# Patient Record
Sex: MALE | Race: WHITE | NOT HISPANIC OR LATINO | Employment: OTHER | URBAN - METROPOLITAN AREA
[De-identification: names, ages, dates, MRNs, and addresses within clinical notes are randomized per-mention and may not be internally consistent; named-entity substitution may affect disease eponyms.]

---

## 2023-03-16 LAB
ALANINE AMINOTRANSFERASE (SGPT) (U/L) IN SER/PLAS: 20 U/L (ref 10–52)
ALBUMIN (G/DL) IN SER/PLAS: 4.3 G/DL (ref 3.4–5)
ALKALINE PHOSPHATASE (U/L) IN SER/PLAS: 94 U/L (ref 33–136)
ANION GAP IN SER/PLAS: 12 MMOL/L (ref 10–20)
ASPARTATE AMINOTRANSFERASE (SGOT) (U/L) IN SER/PLAS: 18 U/L (ref 9–39)
BASOPHILS (10*3/UL) IN BLOOD BY AUTOMATED COUNT: 0.08 X10E9/L (ref 0–0.1)
BASOPHILS/100 LEUKOCYTES IN BLOOD BY AUTOMATED COUNT: 1.1 % (ref 0–2)
BILIRUBIN TOTAL (MG/DL) IN SER/PLAS: 0.5 MG/DL (ref 0–1.2)
CALCIUM (MG/DL) IN SER/PLAS: 9 MG/DL (ref 8.6–10.3)
CARBON DIOXIDE, TOTAL (MMOL/L) IN SER/PLAS: 34 MMOL/L (ref 21–32)
CHLORIDE (MMOL/L) IN SER/PLAS: 99 MMOL/L (ref 98–107)
CHOLESTEROL (MG/DL) IN SER/PLAS: 159 MG/DL (ref 0–199)
CHOLESTEROL IN HDL (MG/DL) IN SER/PLAS: 32.8 MG/DL
CHOLESTEROL/HDL RATIO: 4.8
CREATININE (MG/DL) IN SER/PLAS: 1.33 MG/DL (ref 0.5–1.3)
EOSINOPHILS (10*3/UL) IN BLOOD BY AUTOMATED COUNT: 0.4 X10E9/L (ref 0–0.4)
EOSINOPHILS/100 LEUKOCYTES IN BLOOD BY AUTOMATED COUNT: 5.3 % (ref 0–6)
ERYTHROCYTE DISTRIBUTION WIDTH (RATIO) BY AUTOMATED COUNT: 13.5 % (ref 11.5–14.5)
ERYTHROCYTE MEAN CORPUSCULAR HEMOGLOBIN CONCENTRATION (G/DL) BY AUTOMATED: 32.3 G/DL (ref 32–36)
ERYTHROCYTE MEAN CORPUSCULAR VOLUME (FL) BY AUTOMATED COUNT: 90 FL (ref 80–100)
ERYTHROCYTES (10*6/UL) IN BLOOD BY AUTOMATED COUNT: 4.59 X10E12/L (ref 4.5–5.9)
GFR MALE: 57 ML/MIN/1.73M2
GLUCOSE (MG/DL) IN SER/PLAS: 85 MG/DL (ref 74–99)
HEMATOCRIT (%) IN BLOOD BY AUTOMATED COUNT: 41.5 % (ref 41–52)
HEMOGLOBIN (G/DL) IN BLOOD: 13.4 G/DL (ref 13.5–17.5)
IMMATURE GRANULOCYTES/100 LEUKOCYTES IN BLOOD BY AUTOMATED COUNT: 0.4 % (ref 0–0.9)
LDL: 79 MG/DL (ref 0–99)
LEUKOCYTES (10*3/UL) IN BLOOD BY AUTOMATED COUNT: 7.6 X10E9/L (ref 4.4–11.3)
LYMPHOCYTES (10*3/UL) IN BLOOD BY AUTOMATED COUNT: 1.4 X10E9/L (ref 0.8–3)
LYMPHOCYTES/100 LEUKOCYTES IN BLOOD BY AUTOMATED COUNT: 18.5 % (ref 13–44)
MONOCYTES (10*3/UL) IN BLOOD BY AUTOMATED COUNT: 0.73 X10E9/L (ref 0.05–0.8)
MONOCYTES/100 LEUKOCYTES IN BLOOD BY AUTOMATED COUNT: 9.6 % (ref 2–10)
NEUTROPHILS (10*3/UL) IN BLOOD BY AUTOMATED COUNT: 4.93 X10E9/L (ref 1.6–5.5)
NEUTROPHILS/100 LEUKOCYTES IN BLOOD BY AUTOMATED COUNT: 65.1 % (ref 40–80)
NON HDL CHOLESTEROL: 126 MG/DL
PLATELETS (10*3/UL) IN BLOOD AUTOMATED COUNT: 226 X10E9/L (ref 150–450)
POTASSIUM (MMOL/L) IN SER/PLAS: 4.5 MMOL/L (ref 3.5–5.3)
PROTEIN TOTAL: 6.9 G/DL (ref 6.4–8.2)
SODIUM (MMOL/L) IN SER/PLAS: 140 MMOL/L (ref 136–145)
THYROTROPIN (MIU/L) IN SER/PLAS BY DETECTION LIMIT <= 0.05 MIU/L: 1.8 MIU/L (ref 0.44–3.98)
TRIGLYCERIDE (MG/DL) IN SER/PLAS: 235 MG/DL (ref 0–149)
UREA NITROGEN (MG/DL) IN SER/PLAS: 18 MG/DL (ref 6–23)
VLDL: 47 MG/DL (ref 0–40)

## 2023-03-17 LAB — PROSTATE SPECIFIC AG (NG/ML) IN SER/PLAS: 3.32 NG/ML (ref 0–4)

## 2023-08-30 ENCOUNTER — HOSPITAL ENCOUNTER (OUTPATIENT)
Dept: DATA CONVERSION | Facility: HOSPITAL | Age: 73
End: 2023-08-30

## 2023-08-30 DIAGNOSIS — I63.9 CEREBRAL INFARCTION, UNSPECIFIED (MULTI): ICD-10-CM

## 2023-08-30 DIAGNOSIS — G47.33 OBSTRUCTIVE SLEEP APNEA (ADULT) (PEDIATRIC): ICD-10-CM

## 2023-09-02 ENCOUNTER — APPOINTMENT (OUTPATIENT)
Dept: CARDIOLOGY | Facility: CLINIC | Age: 73
End: 2023-09-02
Payer: MEDICARE

## 2023-09-13 PROBLEM — Q21.10 ATRIAL SEPTAL DEFECT WITHIN OVAL FOSSA (HHS-HCC): Status: ACTIVE | Noted: 2023-09-13

## 2023-09-13 PROBLEM — I10 ESSENTIAL HYPERTENSION: Status: ACTIVE | Noted: 2023-09-13

## 2023-09-13 PROBLEM — K21.9 GASTROESOPHAGEAL REFLUX DISEASE: Status: ACTIVE | Noted: 2023-09-13

## 2023-09-13 PROBLEM — R47.01 APHASIA: Status: ACTIVE | Noted: 2023-09-13

## 2023-09-13 PROBLEM — T50.995A DYE ALLERGIC REACTION: Status: ACTIVE | Noted: 2023-09-13

## 2023-09-13 PROBLEM — I69.398 VERTIGO AS LATE EFFECT OF CEREBROVASCULAR ACCIDENT (CVA): Status: ACTIVE | Noted: 2023-09-13

## 2023-09-13 PROBLEM — Z91.89 AT RISK FOR BLEEDING: Status: ACTIVE | Noted: 2023-09-13

## 2023-09-13 PROBLEM — N40.0 BENIGN PROSTATIC HYPERPLASIA: Status: ACTIVE | Noted: 2023-09-13

## 2023-09-13 PROBLEM — G62.9 NEUROPATHY: Status: ACTIVE | Noted: 2023-09-13

## 2023-09-13 PROBLEM — K59.00 CONSTIPATION: Status: ACTIVE | Noted: 2023-09-13

## 2023-09-13 PROBLEM — M62.10: Status: ACTIVE | Noted: 2023-09-13

## 2023-09-13 PROBLEM — F32.9 MAJOR DEPRESSIVE DISORDER, SINGLE EPISODE, UNSPECIFIED: Status: ACTIVE | Noted: 2023-09-13

## 2023-09-13 PROBLEM — R07.9 CHEST PAIN: Status: ACTIVE | Noted: 2023-09-13

## 2023-09-13 PROBLEM — I63.422 CEREBROVASCULAR ACCIDENT (CVA) DUE TO EMBOLISM OF LEFT ANTERIOR CEREBRAL ARTERY (MULTI): Status: ACTIVE | Noted: 2023-09-13

## 2023-09-13 PROBLEM — G81.91 HEMIPARESIS, RIGHT (MULTI): Status: ACTIVE | Noted: 2023-09-13

## 2023-09-13 PROBLEM — R56.9 SEIZURE (MULTI): Status: ACTIVE | Noted: 2023-09-13

## 2023-09-13 PROBLEM — G47.33 OBSTRUCTIVE SLEEP APNEA SYNDROME: Status: ACTIVE | Noted: 2023-09-13

## 2023-09-13 PROBLEM — F32.A DEPRESSION: Status: ACTIVE | Noted: 2023-09-13

## 2023-09-13 PROBLEM — M19.91 PRIMARY OSTEOARTHRITIS: Status: ACTIVE | Noted: 2023-09-13

## 2023-09-13 PROBLEM — I63.9 STROKE (MULTI): Status: ACTIVE | Noted: 2023-09-13

## 2023-09-13 PROBLEM — R10.9 ABDOMINAL PAIN: Status: ACTIVE | Noted: 2023-09-13

## 2023-09-13 PROBLEM — R42 VERTIGO AS LATE EFFECT OF CEREBROVASCULAR ACCIDENT (CVA): Status: ACTIVE | Noted: 2023-09-13

## 2023-09-13 PROBLEM — Q21.12 PATENT FORAMEN OVALE (HHS-HCC): Status: ACTIVE | Noted: 2023-09-13

## 2023-09-13 RX ORDER — CARVEDILOL 6.25 MG/1
1 TABLET ORAL
COMMUNITY
End: 2024-03-04 | Stop reason: WASHOUT

## 2023-09-13 RX ORDER — GABAPENTIN 300 MG/1
1 CAPSULE ORAL DAILY
COMMUNITY
Start: 2021-04-05 | End: 2024-03-04 | Stop reason: SDUPTHER

## 2023-09-13 RX ORDER — WARFARIN 2 MG/1
2 TABLET ORAL
COMMUNITY
End: 2024-03-04 | Stop reason: WASHOUT

## 2023-09-13 RX ORDER — PANTOPRAZOLE SODIUM 40 MG/1
1 TABLET, DELAYED RELEASE ORAL 2 TIMES DAILY
COMMUNITY
End: 2024-03-04 | Stop reason: WASHOUT

## 2023-09-13 RX ORDER — ASPIRIN 81 MG/1
1 TABLET ORAL DAILY
COMMUNITY
Start: 2021-05-19

## 2023-09-13 RX ORDER — METOCLOPRAMIDE 5 MG/1
1 TABLET ORAL
COMMUNITY
End: 2024-03-04 | Stop reason: WASHOUT

## 2023-09-13 RX ORDER — SIMVASTATIN 40 MG/1
1 TABLET, FILM COATED ORAL NIGHTLY
COMMUNITY
Start: 2018-08-01 | End: 2024-03-04 | Stop reason: WASHOUT

## 2023-09-13 RX ORDER — OMEPRAZOLE 20 MG/1
1 CAPSULE, DELAYED RELEASE ORAL 2 TIMES DAILY
COMMUNITY
Start: 2021-04-05 | End: 2024-03-04 | Stop reason: WASHOUT

## 2023-09-13 RX ORDER — BACLOFEN 5 MG/1
1 TABLET ORAL 2 TIMES DAILY PRN
COMMUNITY
End: 2023-12-22

## 2023-09-13 RX ORDER — ATORVASTATIN CALCIUM 80 MG/1
1 TABLET, FILM COATED ORAL DAILY
COMMUNITY
End: 2024-03-04 | Stop reason: WASHOUT

## 2023-09-13 RX ORDER — TAMSULOSIN HYDROCHLORIDE 0.4 MG/1
1 CAPSULE ORAL DAILY
COMMUNITY
End: 2024-03-04 | Stop reason: WASHOUT

## 2023-09-13 RX ORDER — ESCITALOPRAM OXALATE 10 MG/1
1 TABLET ORAL DAILY
COMMUNITY
End: 2024-03-04 | Stop reason: WASHOUT

## 2023-09-13 RX ORDER — SERTRALINE HYDROCHLORIDE 100 MG/1
1 TABLET, FILM COATED ORAL DAILY
COMMUNITY
End: 2024-03-04 | Stop reason: WASHOUT

## 2023-09-13 RX ORDER — ATORVASTATIN CALCIUM 20 MG/1
1 TABLET, FILM COATED ORAL NIGHTLY
COMMUNITY
Start: 2021-07-22 | End: 2024-03-04 | Stop reason: WASHOUT

## 2023-09-13 RX ORDER — DICYCLOMINE HYDROCHLORIDE 10 MG/1
1 CAPSULE ORAL EVERY 4 HOURS PRN
COMMUNITY
End: 2024-03-04 | Stop reason: WASHOUT

## 2023-09-13 RX ORDER — OMEPRAZOLE 40 MG/1
1 CAPSULE, DELAYED RELEASE ORAL
COMMUNITY
End: 2024-03-04 | Stop reason: SDUPTHER

## 2023-09-13 RX ORDER — LEVETIRACETAM 500 MG/1
1 TABLET ORAL 2 TIMES DAILY
COMMUNITY
End: 2024-03-04 | Stop reason: WASHOUT

## 2023-09-13 RX ORDER — ATORVASTATIN CALCIUM 40 MG/1
1 TABLET, FILM COATED ORAL DAILY
COMMUNITY
End: 2024-03-04 | Stop reason: SDUPTHER

## 2023-09-13 RX ORDER — LANOLIN ALCOHOL/MO/W.PET/CERES
1 CREAM (GRAM) TOPICAL DAILY
COMMUNITY

## 2023-09-13 RX ORDER — SERTRALINE HYDROCHLORIDE 50 MG/1
1 TABLET, FILM COATED ORAL DAILY
COMMUNITY
Start: 2021-11-04 | End: 2024-03-04 | Stop reason: SDUPTHER

## 2023-09-13 RX ORDER — ESOMEPRAZOLE MAGNESIUM 40 MG/1
1 CAPSULE, DELAYED RELEASE ORAL DAILY
COMMUNITY
End: 2024-03-04 | Stop reason: WASHOUT

## 2023-09-13 RX ORDER — POLYETHYLENE GLYCOL 3350 17 G/17G
17 POWDER, FOR SOLUTION ORAL DAILY
COMMUNITY
End: 2024-03-04 | Stop reason: WASHOUT

## 2023-09-13 RX ORDER — ACETAMINOPHEN 325 MG/1
2 TABLET ORAL EVERY 6 HOURS PRN
COMMUNITY

## 2023-09-13 RX ORDER — LISINOPRIL AND HYDROCHLOROTHIAZIDE 12.5; 2 MG/1; MG/1
1 TABLET ORAL DAILY
COMMUNITY
Start: 2021-06-08 | End: 2024-03-04 | Stop reason: SDUPTHER

## 2023-09-14 LAB
ALANINE AMINOTRANSFERASE (SGPT) (U/L) IN SER/PLAS: 20 U/L (ref 10–52)
ALBUMIN (G/DL) IN SER/PLAS: 4.3 G/DL (ref 3.4–5)
ALKALINE PHOSPHATASE (U/L) IN SER/PLAS: 82 U/L (ref 33–136)
ANION GAP IN SER/PLAS: 14 MMOL/L (ref 10–20)
ASPARTATE AMINOTRANSFERASE (SGOT) (U/L) IN SER/PLAS: 18 U/L (ref 9–39)
BASOPHILS (10*3/UL) IN BLOOD BY AUTOMATED COUNT: 0.08 X10E9/L (ref 0–0.1)
BASOPHILS/100 LEUKOCYTES IN BLOOD BY AUTOMATED COUNT: 1.3 % (ref 0–2)
BILIRUBIN TOTAL (MG/DL) IN SER/PLAS: 0.7 MG/DL (ref 0–1.2)
CALCIUM (MG/DL) IN SER/PLAS: 9.1 MG/DL (ref 8.6–10.3)
CARBON DIOXIDE, TOTAL (MMOL/L) IN SER/PLAS: 32 MMOL/L (ref 21–32)
CHLORIDE (MMOL/L) IN SER/PLAS: 99 MMOL/L (ref 98–107)
CHOLESTEROL (MG/DL) IN SER/PLAS: 157 MG/DL (ref 0–199)
CHOLESTEROL IN HDL (MG/DL) IN SER/PLAS: 37.3 MG/DL
CHOLESTEROL/HDL RATIO: 4.2
CREATININE (MG/DL) IN SER/PLAS: 1.34 MG/DL (ref 0.5–1.3)
EOSINOPHILS (10*3/UL) IN BLOOD BY AUTOMATED COUNT: 0.31 X10E9/L (ref 0–0.4)
EOSINOPHILS/100 LEUKOCYTES IN BLOOD BY AUTOMATED COUNT: 4.9 % (ref 0–6)
ERYTHROCYTE DISTRIBUTION WIDTH (RATIO) BY AUTOMATED COUNT: 14.6 % (ref 11.5–14.5)
ERYTHROCYTE MEAN CORPUSCULAR HEMOGLOBIN CONCENTRATION (G/DL) BY AUTOMATED: 31.6 G/DL (ref 32–36)
ERYTHROCYTE MEAN CORPUSCULAR VOLUME (FL) BY AUTOMATED COUNT: 91 FL (ref 80–100)
ERYTHROCYTES (10*6/UL) IN BLOOD BY AUTOMATED COUNT: 4.77 X10E12/L (ref 4.5–5.9)
GFR MALE: 56 ML/MIN/1.73M2
GLUCOSE (MG/DL) IN SER/PLAS: 81 MG/DL (ref 74–99)
HEMATOCRIT (%) IN BLOOD BY AUTOMATED COUNT: 43.3 % (ref 41–52)
HEMOGLOBIN (G/DL) IN BLOOD: 13.7 G/DL (ref 13.5–17.5)
IMMATURE GRANULOCYTES/100 LEUKOCYTES IN BLOOD BY AUTOMATED COUNT: 0.3 % (ref 0–0.9)
LDL: 88 MG/DL (ref 0–99)
LEUKOCYTES (10*3/UL) IN BLOOD BY AUTOMATED COUNT: 6.4 X10E9/L (ref 4.4–11.3)
LYMPHOCYTES (10*3/UL) IN BLOOD BY AUTOMATED COUNT: 1.37 X10E9/L (ref 0.8–3)
LYMPHOCYTES/100 LEUKOCYTES IN BLOOD BY AUTOMATED COUNT: 21.5 % (ref 13–44)
MONOCYTES (10*3/UL) IN BLOOD BY AUTOMATED COUNT: 0.46 X10E9/L (ref 0.05–0.8)
MONOCYTES/100 LEUKOCYTES IN BLOOD BY AUTOMATED COUNT: 7.2 % (ref 2–10)
NEUTROPHILS (10*3/UL) IN BLOOD BY AUTOMATED COUNT: 4.13 X10E9/L (ref 1.6–5.5)
NEUTROPHILS/100 LEUKOCYTES IN BLOOD BY AUTOMATED COUNT: 64.8 % (ref 40–80)
PLATELETS (10*3/UL) IN BLOOD AUTOMATED COUNT: 197 X10E9/L (ref 150–450)
POTASSIUM (MMOL/L) IN SER/PLAS: 4 MMOL/L (ref 3.5–5.3)
PROTEIN TOTAL: 6.9 G/DL (ref 6.4–8.2)
SODIUM (MMOL/L) IN SER/PLAS: 141 MMOL/L (ref 136–145)
THYROTROPIN (MIU/L) IN SER/PLAS BY DETECTION LIMIT <= 0.05 MIU/L: 2.15 MIU/L (ref 0.44–3.98)
TRIGLYCERIDE (MG/DL) IN SER/PLAS: 157 MG/DL (ref 0–149)
UREA NITROGEN (MG/DL) IN SER/PLAS: 21 MG/DL (ref 6–23)
VLDL: 31 MG/DL (ref 0–40)

## 2023-09-15 LAB — PROSTATE SPECIFIC AG (NG/ML) IN SER/PLAS: 3.69 NG/ML (ref 0–4)

## 2023-09-20 ENCOUNTER — HOSPITAL ENCOUNTER (OUTPATIENT)
Dept: DATA CONVERSION | Facility: HOSPITAL | Age: 73
Discharge: HOME | End: 2023-09-20
Payer: MEDICARE

## 2023-09-20 DIAGNOSIS — I63.9 CEREBRAL INFARCTION, UNSPECIFIED (MULTI): ICD-10-CM

## 2023-09-20 DIAGNOSIS — R47.01 APHASIA: ICD-10-CM

## 2023-10-05 ENCOUNTER — HOSPITAL ENCOUNTER (OUTPATIENT)
Dept: CARDIOLOGY | Facility: HOSPITAL | Age: 73
Discharge: HOME | End: 2023-10-05
Payer: MEDICARE

## 2023-10-05 DIAGNOSIS — I63.9 CEREBROVASCULAR ACCIDENT (CVA), UNSPECIFIED MECHANISM (MULTI): ICD-10-CM

## 2023-10-05 PROCEDURE — 93306 TTE W/DOPPLER COMPLETE: CPT

## 2023-10-05 PROCEDURE — 93306 TTE W/DOPPLER COMPLETE: CPT | Performed by: INTERNAL MEDICINE

## 2023-10-06 LAB — EJECTION FRACTION APICAL 4 CHAMBER: 57.4

## 2023-11-20 ENCOUNTER — TELEPHONE (OUTPATIENT)
Dept: PRIMARY CARE | Facility: CLINIC | Age: 73
End: 2023-11-20
Payer: MEDICARE

## 2023-11-20 NOTE — TELEPHONE ENCOUNTER
Pt spouse states pt wore a heart monitor in September and has already sent it back to the company.

## 2023-12-22 ENCOUNTER — TELEPHONE (OUTPATIENT)
Dept: PRIMARY CARE | Facility: CLINIC | Age: 73
End: 2023-12-22
Payer: MEDICARE

## 2023-12-22 DIAGNOSIS — R25.2 SPASTICITY: Primary | ICD-10-CM

## 2023-12-22 RX ORDER — BACLOFEN 10 MG/1
10 TABLET ORAL 2 TIMES DAILY
Qty: 60 TABLET | Refills: 5 | Status: SHIPPED | OUTPATIENT
Start: 2023-12-22 | End: 2024-03-04 | Stop reason: SDUPTHER

## 2023-12-22 NOTE — TELEPHONE ENCOUNTER
Spouse states neurology used to Rx baclofen but will not Rx until next appt in January.  Asking if you could Rx because not sure will make [place] appt if weather is bad.      States pt currently on baclofen 10 mg.  eCW shows 10 mg but Epic showing 5 mg.  Please advise if will Rx.      [facility] [place] [address] [facility]    Has 18 days left (36 tabs)

## 2024-02-29 ASSESSMENT — ENCOUNTER SYMPTOMS
ABDOMINAL PAIN: 0
SHORTNESS OF BREATH: 0
FEVER: 0

## 2024-02-29 NOTE — PROGRESS NOTES
[facility]: [facility]  PROGRESS NOTE      Victorino Kennedy is a 73 y.o. male that is presenting today for No chief complaint on file..    Assessment/Plan   Diagnoses and all orders for this visit:  Essential hypertension  Mixed hyperlipidemia  Benign prostatic hyperplasia with urinary frequency  Hemiparesis, right (CMS/HCC)  Depression, unspecified depression type  Gastroesophageal reflux disease without esophagitis  Obstructive sleep apnea syndrome  Seizure (CMS/HCC)    Free of anginal symptoms and is continuing to take the atorvastatin and aspirin for secondary prevention of stroke.  The baclofen has been helpful for spasticity.  Blood pressure controlled on lisinopril and hydrochlorothiazide.  He has intermittent neuropathic symptoms, gabapentin is helpful.  Omeprazole is helpful for the GERD.  Continue all medications as is.  Will see him in 6 months with laboratory studies beforehand.  Reviewed the screening and prevention schedule.    Mood continues to be good and he and his wife agree that he should continue the sertraline.    Subjective   HPI  73 y.o. male here for follow up.  Doing great overall and has no complaints.  Functional status is stable.  He has had no recent falls.  No chest pain, shortness of breath or any anginal symptoms.    Review of Systems   Constitutional:  Negative for fever.   Respiratory:  Negative for shortness of breath.    Cardiovascular:  Negative for chest pain.   Gastrointestinal:  Negative for abdominal pain.   All other systems reviewed and are negative.     Objective   There were no vitals filed for this visit.   There is no height or weight on file to calculate BMI.  Physical Exam  Vitals reviewed.   Constitutional:       Appearance: Normal appearance.   Cardiovascular:      Rate and Rhythm: Normal rate and regular rhythm.      Heart sounds: No murmur heard.  Pulmonary:      Breath sounds: Normal breath sounds. No wheezing, rhonchi or rales.  "  Musculoskeletal:      Right lower leg: No edema.      Left lower leg: No edema.       Diagnostic Results   Lab Results   Component Value Date    GLUCOSE 81 09/14/2023    CALCIUM 9.1 09/14/2023     09/14/2023    K 4.0 09/14/2023    CO2 32 09/14/2023    CL 99 09/14/2023    BUN 21 09/14/2023    CREATININE 1.34 (H) 09/14/2023     Lab Results   Component Value Date    ALT 20 09/14/2023    AST 18 09/14/2023    ALKPHOS 82 09/14/2023    BILITOT 0.7 09/14/2023     Lab Results   Component Value Date    WBC 6.4 09/14/2023    HGB 13.7 09/14/2023    HCT 43.3 09/14/2023    MCV 91 09/14/2023     09/14/2023     Lab Results   Component Value Date    CHOL 157 09/14/2023    CHOL 159 03/16/2023    CHOL 156 08/02/2022     Lab Results   Component Value Date    HDL 37.3 (A) 09/14/2023    HDL 32.8 (A) 03/16/2023    HDL 32.2 (A) 08/02/2022     Lab Results   Component Value Date    LDLCALC 124 05/19/2022    LDLCALC 90 11/03/2021    LDLCALC 147 (H) 06/21/2021     Lab Results   Component Value Date    TRIG 157 (H) 09/14/2023    TRIG 235 (H) 03/16/2023    TRIG 193 (H) 08/02/2022     No components found for: \"CHOLHDL\"  Lab Results   Component Value Date    HGBA1C 6.1 (H) 09/05/2020     Other labs not included in the list above were reviewed either before or during this encounter.    History    No past medical history on file.  Past Surgical History:   Procedure Laterality Date    MR HEAD ANGIO WO IV CONTRAST  9/5/2020    MR HEAD ANGIO WO IV CONTRAST [facility]    MR HEAD ANGIO WO IV CONTRAST  9/20/2023    MR HEAD ANGIO WO IV CONTRAST [facility]    OTHER SURGICAL HISTORY  04/05/2021    Cholecystectomy     No family history on file.  Social History     Socioeconomic History    Marital status:      Spouse name: Not on file    Number of children: Not on file    Years of education: Not on file    Highest education level: Not on file   Occupational History    Not on file   Tobacco Use    Smoking status: Not on " file    Smokeless tobacco: Not on file   Substance and Sexual Activity    Alcohol use: Not on file    Drug use: Not on file    Sexual activity: Not on file   Other Topics Concern    Not on file   Social History Narrative    Not on file     Social Determinants of Health     Financial Resource Strain: Not on file   Food Insecurity: Not on file   Transportation Needs: Not on file   Physical Activity: Not on file   Stress: Not on file   Social Connections: Not on file   Intimate Partner Violence: Not on file   Housing Stability: Not on file     Allergies   Allergen Reactions    Shellfish Derived Anaphylaxis     Current Outpatient Medications on File Prior to Visit   Medication Sig Dispense Refill    acetaminophen (Tylenol) 325 mg tablet Take 2 tablets (650 mg) by mouth every 6 hours if needed for mild pain (1 - 3).      aspirin 81 mg EC tablet Take 1 tablet (81 mg) by mouth once daily.      atorvastatin (Lipitor) 20 mg tablet Take 1 tablet (20 mg) by mouth once daily at bedtime.      atorvastatin (Lipitor) 40 mg tablet Take 1 tablet (40 mg) by mouth once daily.      atorvastatin (Lipitor) 80 mg tablet Take 1 tablet (80 mg) by mouth once daily.      baclofen (Lioresal) 10 mg tablet Take 1 tablet (10 mg) by mouth 2 times a day. 60 tablet 5    carvedilol (Coreg) 6.25 mg tablet Take 1 tablet (6.25 mg) by mouth 2 times a day with meals.      cyanocobalamin (Vitamin B-12) 1,000 mcg tablet Take 1 tablet (1,000 mcg) by mouth once daily.      dicyclomine (Bentyl) 10 mg capsule Take 1 capsule (10 mg) by mouth every 4 hours if needed (spasms or abdominal pain).      escitalopram (Lexapro) 10 mg tablet Take 1 tablet (10 mg) by mouth once daily.      esomeprazole (NexIUM) 40 mg DR capsule Take 1 capsule (40 mg) by mouth once daily.      gabapentin (Neurontin) 300 mg capsule Take 1 capsule (300 mg) by mouth once daily.      levETIRAcetam (Keppra) 500 mg tablet Take 1 tablet (500 mg) by mouth 2 times a day.       lisinopriL-hydrochlorothiazide 20-12.5 mg tablet Take 1 tablet by mouth once daily.      metoclopramide (Reglan) 5 mg tablet Take 1 tablet (5 mg) by mouth 4 times a day before meals.      MULTIVITAMIN ORAL Multivitamin      NON FORMULARY Take by mouth. MISC NATURAL PRODUCTS -      omeprazole (PriLOSEC) 20 mg DR capsule Take 1 capsule (20 mg) by mouth 2 times a day.      omeprazole (PriLOSEC) 40 mg DR capsule Take 1 capsule (40 mg) by mouth once daily in the morning. Take before meals.      pantoprazole (ProtoNix) 40 mg EC tablet Take 1 tablet (40 mg) by mouth 2 times a day. DO NOT CRUSH.      polyethylene glycol (Glycolax, Miralax) 17 gram/dose powder Take 17 g by mouth once daily.      sertraline (Zoloft) 100 mg tablet Take 1 tablet (100 mg) by mouth once daily.      sertraline (Zoloft) 50 mg tablet Take 1 tablet (50 mg) by mouth once daily.      simvastatin (Zocor) 40 mg tablet Take 1 tablet (40 mg) by mouth once daily at bedtime.      tamsulosin (Flomax) 0.4 mg 24 hr capsule Take 1 capsule (0.4 mg) by mouth once daily.      warfarin (Coumadin) 2 mg tablet Take 2 tablets (4 mg) by mouth. Daily       No current facility-administered medications on file prior to visit.     Immunization History   Administered Date(s) Administered    Flu vaccine, quadrivalent, high-dose, preservative free, age 65y+ (FLUZONE) 11/08/2022    Influenza, High Dose Seasonal, Preservative Free 09/16/2020, 10/20/2021    Pfizer COVID-19 vaccine, bivalent, age 12 years and older (30 mcg/0.3 mL) 11/08/2022    Pfizer Gray Cap SARS-CoV-2 06/28/2022    Pfizer Purple Cap SARS-CoV-2 03/16/2021, 04/15/2021, 10/20/2021    Pneumococcal conjugate vaccine, 13-valent (PREVNAR 13) 08/07/2017    Pneumococcal polysaccharide vaccine, 23-valent, age 2 years and older (PNEUMOVAX 23) 09/16/2020    Tdap vaccine, age 7 year and older (BOOSTRIX, ADACEL) 12/30/2014     Patient's medical history was reviewed and updated either before or during this encounter.        Gonzalo Aguirre MD

## 2024-03-04 ENCOUNTER — OFFICE VISIT (OUTPATIENT)
Dept: PRIMARY CARE | Facility: CLINIC | Age: 74
End: 2024-03-04
Payer: MEDICARE

## 2024-03-04 VITALS
HEART RATE: 87 BPM | SYSTOLIC BLOOD PRESSURE: 120 MMHG | WEIGHT: 194 LBS | TEMPERATURE: 98 F | BODY MASS INDEX: 28.73 KG/M2 | OXYGEN SATURATION: 97 % | HEIGHT: 69 IN | DIASTOLIC BLOOD PRESSURE: 78 MMHG

## 2024-03-04 DIAGNOSIS — G81.91 HEMIPARESIS, RIGHT (MULTI): ICD-10-CM

## 2024-03-04 DIAGNOSIS — Z01.89 ENCOUNTER FOR ROUTINE LABORATORY TESTING: ICD-10-CM

## 2024-03-04 DIAGNOSIS — E78.2 MIXED HYPERLIPIDEMIA: ICD-10-CM

## 2024-03-04 DIAGNOSIS — R73.9 HYPERGLYCEMIA: ICD-10-CM

## 2024-03-04 DIAGNOSIS — N18.31 STAGE 3A CHRONIC KIDNEY DISEASE (MULTI): ICD-10-CM

## 2024-03-04 DIAGNOSIS — G47.33 OBSTRUCTIVE SLEEP APNEA SYNDROME: ICD-10-CM

## 2024-03-04 DIAGNOSIS — R56.9 SEIZURE (MULTI): ICD-10-CM

## 2024-03-04 DIAGNOSIS — F32.A DEPRESSION, UNSPECIFIED DEPRESSION TYPE: ICD-10-CM

## 2024-03-04 DIAGNOSIS — I10 ESSENTIAL HYPERTENSION: Primary | ICD-10-CM

## 2024-03-04 DIAGNOSIS — F33.9 RECURRENT MAJOR DEPRESSIVE DISORDER, REMISSION STATUS UNSPECIFIED (CMS-HCC): ICD-10-CM

## 2024-03-04 DIAGNOSIS — N40.1 BENIGN PROSTATIC HYPERPLASIA WITH URINARY FREQUENCY: ICD-10-CM

## 2024-03-04 DIAGNOSIS — R25.2 SPASTICITY: ICD-10-CM

## 2024-03-04 DIAGNOSIS — K21.9 GASTROESOPHAGEAL REFLUX DISEASE WITHOUT ESOPHAGITIS: ICD-10-CM

## 2024-03-04 DIAGNOSIS — R35.0 BENIGN PROSTATIC HYPERPLASIA WITH URINARY FREQUENCY: ICD-10-CM

## 2024-03-04 DIAGNOSIS — E55.9 VITAMIN D DEFICIENCY: ICD-10-CM

## 2024-03-04 PROBLEM — G81.90 HEMIPLEGIA (MULTI): Status: ACTIVE | Noted: 2024-03-04

## 2024-03-04 PROCEDURE — 99214 OFFICE O/P EST MOD 30 MIN: CPT | Performed by: INTERNAL MEDICINE

## 2024-03-04 PROCEDURE — 3078F DIAST BP <80 MM HG: CPT | Performed by: INTERNAL MEDICINE

## 2024-03-04 PROCEDURE — 1159F MED LIST DOCD IN RCRD: CPT | Performed by: INTERNAL MEDICINE

## 2024-03-04 PROCEDURE — 3074F SYST BP LT 130 MM HG: CPT | Performed by: INTERNAL MEDICINE

## 2024-03-04 PROCEDURE — 1157F ADVNC CARE PLAN IN RCRD: CPT | Performed by: INTERNAL MEDICINE

## 2024-03-04 PROCEDURE — 1036F TOBACCO NON-USER: CPT | Performed by: INTERNAL MEDICINE

## 2024-03-04 PROCEDURE — 1126F AMNT PAIN NOTED NONE PRSNT: CPT | Performed by: INTERNAL MEDICINE

## 2024-03-04 RX ORDER — GABAPENTIN 300 MG/1
300 CAPSULE ORAL DAILY
Qty: 90 CAPSULE | Refills: 3 | Status: SHIPPED | OUTPATIENT
Start: 2024-03-04 | End: 2025-03-04

## 2024-03-04 RX ORDER — ATORVASTATIN CALCIUM 40 MG/1
40 TABLET, FILM COATED ORAL DAILY
Qty: 90 TABLET | Refills: 3 | Status: SHIPPED | OUTPATIENT
Start: 2024-03-04 | End: 2025-03-04

## 2024-03-04 RX ORDER — SERTRALINE HYDROCHLORIDE 50 MG/1
50 TABLET, FILM COATED ORAL DAILY
Qty: 90 TABLET | Refills: 3 | Status: SHIPPED | OUTPATIENT
Start: 2024-03-04 | End: 2025-03-04

## 2024-03-04 RX ORDER — OMEPRAZOLE 40 MG/1
40 CAPSULE, DELAYED RELEASE ORAL
Qty: 90 CAPSULE | Refills: 3 | Status: SHIPPED | OUTPATIENT
Start: 2024-03-04 | End: 2025-03-04

## 2024-03-04 RX ORDER — LISINOPRIL AND HYDROCHLOROTHIAZIDE 12.5; 2 MG/1; MG/1
1 TABLET ORAL DAILY
Qty: 90 TABLET | Refills: 3 | Status: SHIPPED | OUTPATIENT
Start: 2024-03-04 | End: 2025-03-04

## 2024-03-04 RX ORDER — BACLOFEN 10 MG/1
10 TABLET ORAL 2 TIMES DAILY
Qty: 180 TABLET | Refills: 3 | Status: SHIPPED | OUTPATIENT
Start: 2024-03-04 | End: 2025-03-04

## 2024-03-04 ASSESSMENT — PATIENT HEALTH QUESTIONNAIRE - PHQ9
SUM OF ALL RESPONSES TO PHQ9 QUESTIONS 1 AND 2: 0
1. LITTLE INTEREST OR PLEASURE IN DOING THINGS: NOT AT ALL
2. FEELING DOWN, DEPRESSED OR HOPELESS: NOT AT ALL

## 2024-03-04 ASSESSMENT — ENCOUNTER SYMPTOMS
DEPRESSION: 0
OCCASIONAL FEELINGS OF UNSTEADINESS: 1
LOSS OF SENSATION IN FEET: 0

## 2024-03-04 ASSESSMENT — PAIN SCALES - GENERAL: PAINLEVEL: 0-NO PAIN

## 2024-07-10 ENCOUNTER — NURSE TRIAGE (OUTPATIENT)
Dept: PRIMARY CARE | Facility: CLINIC | Age: 74
End: 2024-07-10
Payer: MEDICARE

## 2024-08-27 ENCOUNTER — LAB (OUTPATIENT)
Dept: LAB | Facility: LAB | Age: 74
End: 2024-08-27
Payer: MEDICARE

## 2024-08-27 DIAGNOSIS — R73.9 HYPERGLYCEMIA: ICD-10-CM

## 2024-08-27 DIAGNOSIS — Z01.89 ENCOUNTER FOR ROUTINE LABORATORY TESTING: ICD-10-CM

## 2024-08-27 DIAGNOSIS — I10 ESSENTIAL HYPERTENSION: ICD-10-CM

## 2024-08-27 DIAGNOSIS — E55.9 VITAMIN D DEFICIENCY: ICD-10-CM

## 2024-08-27 LAB
ALBUMIN SERPL BCP-MCNC: 4.2 G/DL (ref 3.4–5)
ALP SERPL-CCNC: 90 U/L (ref 33–136)
ALT SERPL W P-5'-P-CCNC: 23 U/L (ref 10–52)
ANION GAP SERPL CALC-SCNC: 16 MMOL/L (ref 10–20)
AST SERPL W P-5'-P-CCNC: 18 U/L (ref 9–39)
BASOPHILS # BLD AUTO: 0.07 X10*3/UL (ref 0–0.1)
BASOPHILS NFR BLD AUTO: 0.9 %
BILIRUB SERPL-MCNC: 0.5 MG/DL (ref 0–1.2)
BUN SERPL-MCNC: 21 MG/DL (ref 6–23)
CALCIUM SERPL-MCNC: 8.7 MG/DL (ref 8.6–10.3)
CHLORIDE SERPL-SCNC: 101 MMOL/L (ref 98–107)
CHOLEST SERPL-MCNC: 147 MG/DL (ref 0–199)
CHOLESTEROL/HDL RATIO: 4.1
CO2 SERPL-SCNC: 27 MMOL/L (ref 21–32)
CREAT SERPL-MCNC: 1.4 MG/DL (ref 0.5–1.3)
EGFRCR SERPLBLD CKD-EPI 2021: 53 ML/MIN/1.73M*2
EOSINOPHIL # BLD AUTO: 0.32 X10*3/UL (ref 0–0.4)
EOSINOPHIL NFR BLD AUTO: 3.9 %
ERYTHROCYTE [DISTWIDTH] IN BLOOD BY AUTOMATED COUNT: 14.5 % (ref 11.5–14.5)
GLUCOSE SERPL-MCNC: 99 MG/DL (ref 74–99)
HCT VFR BLD AUTO: 42 % (ref 41–52)
HDLC SERPL-MCNC: 35.5 MG/DL
HGB BLD-MCNC: 13.5 G/DL (ref 13.5–17.5)
IMM GRANULOCYTES # BLD AUTO: 0.04 X10*3/UL (ref 0–0.5)
IMM GRANULOCYTES NFR BLD AUTO: 0.5 % (ref 0–0.9)
LDLC SERPL CALC-MCNC: 69 MG/DL
LYMPHOCYTES # BLD AUTO: 1.32 X10*3/UL (ref 0.8–3)
LYMPHOCYTES NFR BLD AUTO: 16 %
MCH RBC QN AUTO: 29.1 PG (ref 26–34)
MCHC RBC AUTO-ENTMCNC: 32.1 G/DL (ref 32–36)
MCV RBC AUTO: 91 FL (ref 80–100)
MONOCYTES # BLD AUTO: 0.65 X10*3/UL (ref 0.05–0.8)
MONOCYTES NFR BLD AUTO: 7.9 %
NEUTROPHILS # BLD AUTO: 5.83 X10*3/UL (ref 1.6–5.5)
NEUTROPHILS NFR BLD AUTO: 70.8 %
NON HDL CHOLESTEROL: 112 MG/DL (ref 0–149)
NRBC BLD-RTO: 0 /100 WBCS (ref 0–0)
PLATELET # BLD AUTO: 203 X10*3/UL (ref 150–450)
POTASSIUM SERPL-SCNC: 4.4 MMOL/L (ref 3.5–5.3)
PROT SERPL-MCNC: 6.9 G/DL (ref 6.4–8.2)
RBC # BLD AUTO: 4.64 X10*6/UL (ref 4.5–5.9)
SODIUM SERPL-SCNC: 140 MMOL/L (ref 136–145)
TRIGL SERPL-MCNC: 214 MG/DL (ref 0–149)
TSH SERPL-ACNC: 2.37 MIU/L (ref 0.44–3.98)
VLDL: 43 MG/DL (ref 0–40)
WBC # BLD AUTO: 8.2 X10*3/UL (ref 4.4–11.3)

## 2024-08-27 PROCEDURE — 36415 COLL VENOUS BLD VENIPUNCTURE: CPT

## 2024-08-27 PROCEDURE — 83036 HEMOGLOBIN GLYCOSYLATED A1C: CPT

## 2024-08-27 PROCEDURE — 84443 ASSAY THYROID STIM HORMONE: CPT

## 2024-08-27 PROCEDURE — 80053 COMPREHEN METABOLIC PANEL: CPT

## 2024-08-27 PROCEDURE — 80061 LIPID PANEL: CPT

## 2024-08-27 PROCEDURE — 85025 COMPLETE CBC W/AUTO DIFF WBC: CPT

## 2024-08-27 PROCEDURE — 82306 VITAMIN D 25 HYDROXY: CPT

## 2024-08-28 LAB
25(OH)D3 SERPL-MCNC: 25 NG/ML (ref 30–100)
EST. AVERAGE GLUCOSE BLD GHB EST-MCNC: 128 MG/DL
HBA1C MFR BLD: 6.1 %

## 2024-09-05 ASSESSMENT — ENCOUNTER SYMPTOMS
SHORTNESS OF BREATH: 0
CHILLS: 0
FEVER: 0
ABDOMINAL PAIN: 0
VOMITING: 0
HEADACHES: 0
COUGH: 0
APPETITE CHANGE: 0
DIARRHEA: 0
NAUSEA: 0

## 2024-09-05 NOTE — PROGRESS NOTES
[facility]: [facility]  MEDICARE WELLNESS EXAM      Victorino Kennedy is a 73 y.o. male that is presenting today for Annual Exam.    Assessment/Plan    Diagnoses and all orders for this visit:  Annual physical exam  Essential hypertension  Mixed hyperlipidemia  Hemiparesis, right (Multi)  Benign prostatic hyperplasia with urinary frequency  Depression, unspecified depression type  Gastroesophageal reflux disease without esophagitis  Seizure (Multi)  Vitamin D deficiency  Stage 3a chronic kidney disease (Multi)  Obstructive sleep apnea syndrome  Spasticity  Encounter for immunization  -     Flu vaccine, trivalent, preservative free, HIGH-DOSE, age 65y+ (Fluzone)    Continue aspirin and statin for secondary prevention of stroke.  The baclofen and the gabapentin a been helpful for his spasticity and neuropathic symptoms.  Blood pressure controlled on lisinopril, hydrochlorothiazide.  The sertraline continues to be effective for mood.  Omeprazole is helpful for GERD and he does get reflux symptoms without it, continue as is    ADVANCED CARE PLANNING  Advanced Care Planning was discussed with patient:  The patient has an active advanced care plan on file. The patient has an active surrogate decision-maker on file.  Encouraged the patient to confirm that Living Will and Healthcare Power of  (HCPoA) are accurate and up to date.  Encouraged the patient to confirm that our office be provided a copy of any documentation in the event that anything changes.    ACTIVITIES OF DAILY LIVING  Basic ADLs:  Bathing: Independent, Dressing: Independent, Toileting: Independent, Transferring: Independent, Continence: Independent, Feeding: Independent.    Instrumental ADLs:  Ability to use phone: Independent, Shopping: Independent, Cooking: Independent, House-keeping: Independent, Laundry: Independent, Transportation: Independent, Medication Management: Independent, Finance Management:  Independent.    Subjective   HPI  This patient presents today for annual physical, Medicare wellness exam.  Discussed screening/prevention, healthy lifestyle and code status.   Reviewed the patient's wishes regarding decision making.    Consultant visits and notes reviewed: Sleep med ([facility])    Stable from a functional standpoint in regard to ADLs and IADLs.  No recent falls are reported.    The patient denies chest pain and shortness of breath.  No exertion-provoked or anginal-type symptoms are reported.    Review of Systems   Constitutional:  Negative for appetite change, chills and fever.   Respiratory:  Negative for cough and shortness of breath.    Cardiovascular:  Negative for chest pain.   Gastrointestinal:  Negative for abdominal pain, diarrhea, nausea and vomiting.   Neurological:  Negative for headaches.   All other systems reviewed and are negative.    Objective   Vitals:    09/06/24 1540   BP: 120/66   Pulse: 84   Temp: 35.9 °C (96.6 °F)   SpO2: 97%      Body mass index is 29.09 kg/m².  Physical Exam  Vitals reviewed.   Constitutional:       General: He is not in acute distress.     Appearance: He is not toxic-appearing.   HENT:      Head: Normocephalic and atraumatic.      Mouth/Throat:      Mouth: Mucous membranes are moist.   Eyes:      Pupils: Pupils are equal, round, and reactive to light.   Cardiovascular:      Rate and Rhythm: Normal rate and regular rhythm.      Heart sounds: No murmur heard.  Pulmonary:      Breath sounds: Normal breath sounds. No wheezing, rhonchi or rales.   Abdominal:      General: There is no distension.      Palpations: Abdomen is soft.   Musculoskeletal:      Right lower leg: No edema.      Left lower leg: No edema.   Neurological:      Mental Status: He is alert.     Stable right hemiparesis    Diagnostic Results   Lab Results   Component Value Date    GLUCOSE 99 08/27/2024    CALCIUM 8.7 08/27/2024     08/27/2024    K 4.4 08/27/2024    CO2 27 08/27/2024    CL  "101 08/27/2024    BUN 21 08/27/2024    CREATININE 1.40 (H) 08/27/2024     Lab Results   Component Value Date    ALT 23 08/27/2024    AST 18 08/27/2024    ALKPHOS 90 08/27/2024    BILITOT 0.5 08/27/2024     Lab Results   Component Value Date    WBC 8.2 08/27/2024    HGB 13.5 08/27/2024    HCT 42.0 08/27/2024    MCV 91 08/27/2024     08/27/2024     Lab Results   Component Value Date    CHOL 147 08/27/2024    CHOL 157 09/14/2023    CHOL 159 03/16/2023     Lab Results   Component Value Date    HDL 35.5 08/27/2024    HDL 37.3 (A) 09/14/2023    HDL 32.8 (A) 03/16/2023     Lab Results   Component Value Date    LDLCALC 69 08/27/2024    LDLCALC 124 05/19/2022    LDLCALC 90 11/03/2021     Lab Results   Component Value Date    TRIG 214 (H) 08/27/2024    TRIG 157 (H) 09/14/2023    TRIG 235 (H) 03/16/2023     No components found for: \"CHOLHDL\"  Lab Results   Component Value Date    HGBA1C 6.1 (H) 08/27/2024     Other labs not included in the list above reviewed either before or during this encounter.    History   Past Medical History:   Diagnosis Date    Stroke (Multi)      Past Surgical History:   Procedure Laterality Date    MR HEAD ANGIO WO IV CONTRAST  9/5/2020    MR HEAD ANGIO WO IV CONTRAST [facility]    MR HEAD ANGIO WO IV CONTRAST  9/20/2023    MR HEAD ANGIO WO IV CONTRAST [facility]    OTHER SURGICAL HISTORY  04/05/2021    Cholecystectomy     No family history on file.  Social History     Socioeconomic History    Marital status:      Spouse name: Not on file    Number of children: Not on file    Years of education: Not on file    Highest education level: Not on file   Occupational History    Not on file   Tobacco Use    Smoking status: Never    Smokeless tobacco: Never   Vaping Use    Vaping status: Never Used   Substance and Sexual Activity    Alcohol use: Not Currently    Drug use: Never    Sexual activity: Not on file   Other Topics Concern    Not on file   Social History Narrative "    Not on file     Social Determinants of Health     Financial Resource Strain: Not on file   Food Insecurity: Not on file   Transportation Needs: Not on file   Physical Activity: Not on file   Stress: Not on file   Social Connections: Not on file   Intimate Partner Violence: Not on file   Housing Stability: Not on file     Allergies   Allergen Reactions    Shellfish Derived Anaphylaxis     Current Outpatient Medications on File Prior to Visit   Medication Sig Dispense Refill    acetaminophen (Tylenol) 325 mg tablet Take 2 tablets (650 mg) by mouth every 6 hours if needed for mild pain (1 - 3).      aspirin 81 mg EC tablet Take 1 tablet (81 mg) by mouth once daily.      atorvastatin (Lipitor) 40 mg tablet Take 1 tablet (40 mg) by mouth once daily. 90 tablet 3    baclofen (Lioresal) 10 mg tablet Take 1 tablet (10 mg) by mouth 2 times a day. 180 tablet 3    cyanocobalamin (Vitamin B-12) 1,000 mcg tablet Take 1 tablet (1,000 mcg) by mouth once daily.      gabapentin (Neurontin) 300 mg capsule Take 1 capsule (300 mg) by mouth once daily. 90 capsule 3    lisinopriL-hydrochlorothiazide 20-12.5 mg tablet Take 1 tablet by mouth once daily. 90 tablet 3    NON FORMULARY Lions rodrigo mushroom- 4500mg BID      omeprazole (PriLOSEC) 40 mg DR capsule Take 1 capsule (40 mg) by mouth once daily in the morning. Take before meals. 90 capsule 3    sertraline (Zoloft) 100 mg tablet take 1 tablet Orally Once a day 90 days 90 tablet 3    [DISCONTINUED] MULTIVITAMIN ORAL Multivitamin      [DISCONTINUED] sertraline (Zoloft) 50 mg tablet Take 1 tablet (50 mg) by mouth once daily. 90 tablet 3     No current facility-administered medications on file prior to visit.     Immunization History   Administered Date(s) Administered    Flu vaccine, quadrivalent, high-dose, preservative free, age 65y+ (FLUZONE) 11/08/2022, 11/21/2023    Flu vaccine, trivalent, preservative free, HIGH-DOSE, age 65y+ (Fluzone) 09/16/2020, 10/20/2021, 09/06/2024    Pfizer  COVID-19 vaccine, Fall 2023, 12 years and older, (30mcg/0.3mL) 11/21/2023    Pfizer COVID-19 vaccine, bivalent, age 12 years and older (30 mcg/0.3 mL) 11/08/2022    Pfizer Gray Cap SARS-CoV-2 06/28/2022    Pfizer Purple Cap SARS-CoV-2 03/16/2021, 04/15/2021, 10/20/2021    Pneumococcal conjugate vaccine, 13-valent (PREVNAR 13) 08/07/2017    Pneumococcal polysaccharide vaccine, 23-valent, age 2 years and older (PNEUMOVAX 23) 09/16/2020    Tdap vaccine, age 7 year and older (BOOSTRIX, ADACEL) 12/30/2014     Patient's medical history was reviewed and updated either before or during this encounter.     Gonzalo Aguirre MD

## 2024-09-06 ENCOUNTER — APPOINTMENT (OUTPATIENT)
Dept: PRIMARY CARE | Facility: CLINIC | Age: 74
End: 2024-09-06
Payer: MEDICARE

## 2024-09-06 ENCOUNTER — OFFICE VISIT (OUTPATIENT)
Dept: PRIMARY CARE | Facility: CLINIC | Age: 74
End: 2024-09-06
Payer: MEDICARE

## 2024-09-06 VITALS
SYSTOLIC BLOOD PRESSURE: 120 MMHG | DIASTOLIC BLOOD PRESSURE: 66 MMHG | WEIGHT: 197 LBS | HEART RATE: 84 BPM | TEMPERATURE: 96.6 F | BODY MASS INDEX: 29.18 KG/M2 | OXYGEN SATURATION: 97 % | HEIGHT: 69 IN

## 2024-09-06 DIAGNOSIS — K21.9 GASTROESOPHAGEAL REFLUX DISEASE WITHOUT ESOPHAGITIS: ICD-10-CM

## 2024-09-06 DIAGNOSIS — N18.31 STAGE 3A CHRONIC KIDNEY DISEASE (MULTI): ICD-10-CM

## 2024-09-06 DIAGNOSIS — N40.1 BENIGN PROSTATIC HYPERPLASIA WITH URINARY FREQUENCY: ICD-10-CM

## 2024-09-06 DIAGNOSIS — R35.0 BENIGN PROSTATIC HYPERPLASIA WITH URINARY FREQUENCY: ICD-10-CM

## 2024-09-06 DIAGNOSIS — I10 ESSENTIAL HYPERTENSION: ICD-10-CM

## 2024-09-06 DIAGNOSIS — F32.A DEPRESSION, UNSPECIFIED DEPRESSION TYPE: ICD-10-CM

## 2024-09-06 DIAGNOSIS — E55.9 VITAMIN D DEFICIENCY: ICD-10-CM

## 2024-09-06 DIAGNOSIS — Z23 ENCOUNTER FOR IMMUNIZATION: ICD-10-CM

## 2024-09-06 DIAGNOSIS — G81.91 HEMIPARESIS, RIGHT (MULTI): ICD-10-CM

## 2024-09-06 DIAGNOSIS — Z00.00 ANNUAL PHYSICAL EXAM: Primary | ICD-10-CM

## 2024-09-06 DIAGNOSIS — R56.9 SEIZURE (MULTI): ICD-10-CM

## 2024-09-06 DIAGNOSIS — R25.2 SPASTICITY: ICD-10-CM

## 2024-09-06 DIAGNOSIS — G47.33 OBSTRUCTIVE SLEEP APNEA SYNDROME: ICD-10-CM

## 2024-09-06 DIAGNOSIS — E78.2 MIXED HYPERLIPIDEMIA: ICD-10-CM

## 2024-09-06 PROCEDURE — 90662 IIV NO PRSV INCREASED AG IM: CPT | Performed by: INTERNAL MEDICINE

## 2024-09-06 PROCEDURE — 99215 OFFICE O/P EST HI 40 MIN: CPT | Performed by: INTERNAL MEDICINE

## 2024-09-06 ASSESSMENT — PATIENT HEALTH QUESTIONNAIRE - PHQ9
2. FEELING DOWN, DEPRESSED OR HOPELESS: NOT AT ALL
SUM OF ALL RESPONSES TO PHQ9 QUESTIONS 1 AND 2: 0
1. LITTLE INTEREST OR PLEASURE IN DOING THINGS: NOT AT ALL

## 2024-09-06 ASSESSMENT — ENCOUNTER SYMPTOMS
DEPRESSION: 0
OCCASIONAL FEELINGS OF UNSTEADINESS: 1
LOSS OF SENSATION IN FEET: 0

## 2024-09-06 ASSESSMENT — PAIN SCALES - GENERAL: PAINLEVEL: 0-NO PAIN

## 2025-03-02 ASSESSMENT — ENCOUNTER SYMPTOMS
FEVER: 0
ABDOMINAL PAIN: 0
SHORTNESS OF BREATH: 0

## 2025-03-02 NOTE — PROGRESS NOTES
[facility]: [facility]  PROGRESS NOTE      Victorino Kennedy is a 74 y.o. male that is presenting today for No chief complaint on file..    Assessment/Plan   Diagnoses and all orders for this visit:  Essential hypertension  Mixed hyperlipidemia  Hemiparesis, right (Multi)  Benign prostatic hyperplasia with urinary frequency  Depression, unspecified depression type  Seizure (Multi)  Stage 3a chronic kidney disease (Multi)  Gastroesophageal reflux disease without esophagitis  Obstructive sleep apnea syndrome    Subjective   HPI  74 y.o. male here for follow up.    Review of Systems   Constitutional:  Negative for fever.   Respiratory:  Negative for shortness of breath.    Cardiovascular:  Negative for chest pain.   Gastrointestinal:  Negative for abdominal pain.   All other systems reviewed and are negative.     Objective   There were no vitals filed for this visit.   There is no height or weight on file to calculate BMI.  Physical Exam  Vitals reviewed.   Constitutional:       Appearance: Normal appearance.   Cardiovascular:      Rate and Rhythm: Normal rate and regular rhythm.      Heart sounds: No murmur heard.  Pulmonary:      Breath sounds: Normal breath sounds. No wheezing, rhonchi or rales.   Musculoskeletal:      Right lower leg: No edema.      Left lower leg: No edema.       Diagnostic Results   Lab Results   Component Value Date    GLUCOSE 99 08/27/2024    CALCIUM 8.7 08/27/2024     08/27/2024    K 4.4 08/27/2024    CO2 27 08/27/2024     08/27/2024    BUN 21 08/27/2024    CREATININE 1.40 (H) 08/27/2024     Lab Results   Component Value Date    ALT 23 08/27/2024    AST 18 08/27/2024    ALKPHOS 90 08/27/2024    BILITOT 0.5 08/27/2024     Lab Results   Component Value Date    WBC 8.2 08/27/2024    HGB 13.5 08/27/2024    HCT 42.0 08/27/2024    MCV 91 08/27/2024     08/27/2024     Lab Results   Component Value Date    CHOL 147 08/27/2024    CHOL 157 09/14/2023    CHOL 159  "03/16/2023     Lab Results   Component Value Date    HDL 35.5 08/27/2024    HDL 37.3 (A) 09/14/2023    HDL 32.8 (A) 03/16/2023     Lab Results   Component Value Date    LDLCALC 69 08/27/2024    LDLCALC 124 05/19/2022    LDLCALC 90 11/03/2021     Lab Results   Component Value Date    TRIG 214 (H) 08/27/2024    TRIG 157 (H) 09/14/2023    TRIG 235 (H) 03/16/2023     No components found for: \"CHOLHDL\"  Lab Results   Component Value Date    HGBA1C 6.1 (H) 08/27/2024     Other labs not included in the list above were reviewed either before or during this encounter.    History    Past Medical History:   Diagnosis Date    Stroke (Multi)      Past Surgical History:   Procedure Laterality Date    MR HEAD ANGIO WO IV CONTRAST  9/5/2020    MR HEAD ANGIO WO IV CONTRAST [facility]    MR HEAD ANGIO WO IV CONTRAST  9/20/2023    MR HEAD ANGIO WO IV CONTRAST [facility]    OTHER SURGICAL HISTORY  04/05/2021    Cholecystectomy     No family history on file.  Social History     Socioeconomic History    Marital status:      Spouse name: Not on file    Number of children: Not on file    Years of education: Not on file    Highest education level: Not on file   Occupational History    Not on file   Tobacco Use    Smoking status: Never    Smokeless tobacco: Never   Vaping Use    Vaping status: Never Used   Substance and Sexual Activity    Alcohol use: Not Currently    Drug use: Never    Sexual activity: Not on file   Other Topics Concern    Not on file   Social History Narrative    Not on file     Social Drivers of Health     Financial Resource Strain: Not on file   Food Insecurity: Not on file   Transportation Needs: Not on file   Physical Activity: Not on file   Stress: Not on file   Social Connections: Not on file   Intimate Partner Violence: Not on file   Housing Stability: Not on file     Allergies   Allergen Reactions    Shellfish Derived Anaphylaxis     Current Outpatient Medications on File Prior to Visit "   Medication Sig Dispense Refill    acetaminophen (Tylenol) 325 mg tablet Take 2 tablets (650 mg) by mouth every 6 hours if needed for mild pain (1 - 3).      aspirin 81 mg EC tablet Take 1 tablet (81 mg) by mouth once daily.      atorvastatin (Lipitor) 40 mg tablet Take 1 tablet (40 mg) by mouth once daily. 90 tablet 3    baclofen (Lioresal) 10 mg tablet Take 1 tablet (10 mg) by mouth 2 times a day. 180 tablet 3    cyanocobalamin (Vitamin B-12) 1,000 mcg tablet Take 1 tablet (1,000 mcg) by mouth once daily.      gabapentin (Neurontin) 300 mg capsule Take 1 capsule (300 mg) by mouth once daily. 90 capsule 3    lisinopriL-hydrochlorothiazide 20-12.5 mg tablet Take 1 tablet by mouth once daily. 90 tablet 3    NON FORMULARY Lions rodrigo mushroom- 4500mg BID      omeprazole (PriLOSEC) 40 mg DR capsule Take 1 capsule (40 mg) by mouth once daily in the morning. Take before meals. 90 capsule 3    sertraline (Zoloft) 100 mg tablet take 1 tablet Orally Once a day 90 days 90 tablet 3     No current facility-administered medications on file prior to visit.     Immunization History   Administered Date(s) Administered    Flu vaccine, quadrivalent, high-dose, preservative free, age 65y+ (FLUZONE) 11/08/2022, 11/21/2023    Flu vaccine, trivalent, preservative free, HIGH-DOSE, age 65y+ (Fluzone) 09/16/2020, 10/20/2021, 09/06/2024    Pfizer COVID-19 vaccine, 12 years and older, (30mcg/0.3mL) (Comirnaty) 11/21/2023    Pfizer COVID-19 vaccine, bivalent, age 12 years and older (30 mcg/0.3 mL) 11/08/2022    Pfizer Gray Cap SARS-CoV-2 06/28/2022    Pfizer Purple Cap SARS-CoV-2 03/16/2021, 04/15/2021, 10/20/2021    Pneumococcal conjugate vaccine, 13-valent (PREVNAR 13) 08/07/2017    Pneumococcal polysaccharide vaccine, 23-valent, age 2 years and older (PNEUMOVAX 23) 09/16/2020    Tdap vaccine, age 7 year and older (BOOSTRIX, ADACEL) 12/30/2014     Patient's medical history was reviewed and updated either before or during this encounter.        Gonzalo Aguirre MD

## 2025-03-06 ENCOUNTER — OFFICE VISIT (OUTPATIENT)
Dept: PRIMARY CARE | Facility: CLINIC | Age: 75
End: 2025-03-06
Payer: MEDICARE

## 2025-03-06 DIAGNOSIS — Z12.5 ENCOUNTER FOR PROSTATE CANCER SCREENING: ICD-10-CM

## 2025-03-06 DIAGNOSIS — R56.9 SEIZURE (MULTI): ICD-10-CM

## 2025-03-06 DIAGNOSIS — R35.0 BENIGN PROSTATIC HYPERPLASIA WITH URINARY FREQUENCY: ICD-10-CM

## 2025-03-06 DIAGNOSIS — I10 ESSENTIAL HYPERTENSION: ICD-10-CM

## 2025-03-06 DIAGNOSIS — F32.A DEPRESSION, UNSPECIFIED DEPRESSION TYPE: ICD-10-CM

## 2025-03-06 DIAGNOSIS — K21.9 GASTROESOPHAGEAL REFLUX DISEASE WITHOUT ESOPHAGITIS: ICD-10-CM

## 2025-03-06 DIAGNOSIS — I10 ESSENTIAL HYPERTENSION: Primary | ICD-10-CM

## 2025-03-06 DIAGNOSIS — G81.91 HEMIPARESIS, RIGHT (MULTI): ICD-10-CM

## 2025-03-06 DIAGNOSIS — E78.2 MIXED HYPERLIPIDEMIA: ICD-10-CM

## 2025-03-06 DIAGNOSIS — N18.31 STAGE 3A CHRONIC KIDNEY DISEASE (MULTI): ICD-10-CM

## 2025-03-06 DIAGNOSIS — G47.33 OBSTRUCTIVE SLEEP APNEA SYNDROME: ICD-10-CM

## 2025-03-06 DIAGNOSIS — R25.2 SPASTICITY: ICD-10-CM

## 2025-03-06 DIAGNOSIS — R73.9 HYPERGLYCEMIA: ICD-10-CM

## 2025-03-06 DIAGNOSIS — E55.9 VITAMIN D DEFICIENCY: ICD-10-CM

## 2025-03-06 DIAGNOSIS — N40.1 BENIGN PROSTATIC HYPERPLASIA WITH URINARY FREQUENCY: ICD-10-CM

## 2025-03-06 RX ORDER — GABAPENTIN 300 MG/1
300 CAPSULE ORAL DAILY
Qty: 90 CAPSULE | Refills: 3 | Status: SHIPPED | OUTPATIENT
Start: 2025-03-06 | End: 2026-03-06

## 2025-03-06 RX ORDER — BACLOFEN 10 MG/1
10 TABLET ORAL 2 TIMES DAILY
Qty: 180 TABLET | Refills: 3 | Status: SHIPPED | OUTPATIENT
Start: 2025-03-06 | End: 2026-03-06

## 2025-03-06 RX ORDER — SERTRALINE HYDROCHLORIDE 100 MG/1
100 TABLET, FILM COATED ORAL DAILY
Qty: 90 TABLET | Refills: 3 | Status: SHIPPED | OUTPATIENT
Start: 2025-03-06

## 2025-03-06 RX ORDER — ATORVASTATIN CALCIUM 40 MG/1
40 TABLET, FILM COATED ORAL DAILY
Qty: 90 TABLET | Refills: 3 | Status: SHIPPED | OUTPATIENT
Start: 2025-03-06

## 2025-03-06 RX ORDER — OMEPRAZOLE 40 MG/1
40 CAPSULE, DELAYED RELEASE ORAL
Qty: 90 CAPSULE | Refills: 3 | Status: SHIPPED | OUTPATIENT
Start: 2025-03-06 | End: 2025-03-06 | Stop reason: SDUPTHER

## 2025-03-06 RX ORDER — LISINOPRIL AND HYDROCHLOROTHIAZIDE 12.5; 2 MG/1; MG/1
1 TABLET ORAL DAILY
Qty: 90 TABLET | Refills: 3 | Status: SHIPPED | OUTPATIENT
Start: 2025-03-06 | End: 2025-03-06 | Stop reason: SDUPTHER

## 2025-03-06 RX ORDER — LISINOPRIL AND HYDROCHLOROTHIAZIDE 12.5; 2 MG/1; MG/1
1 TABLET ORAL DAILY
Qty: 90 TABLET | Refills: 3 | Status: SHIPPED | OUTPATIENT
Start: 2025-03-06 | End: 2026-03-06

## 2025-03-06 RX ORDER — OMEPRAZOLE 40 MG/1
40 CAPSULE, DELAYED RELEASE ORAL
Qty: 90 CAPSULE | Refills: 3 | Status: SHIPPED | OUTPATIENT
Start: 2025-03-06

## 2025-03-06 NOTE — TELEPHONE ENCOUNTER
[facility] 9/6/24, [place] 9/8/25    Lisinopril - htz 20-12.5mg  Gabapentin 300mg  Omeprazole 40mg  Sertraline 100mg  Baclofen 10mg    [facility] [place] [address]

## 2025-08-27 ENCOUNTER — TELEPHONE (OUTPATIENT)
Dept: PRIMARY CARE | Facility: CLINIC | Age: 75
End: 2025-08-27
Payer: MEDICARE

## 2025-08-27 DIAGNOSIS — E78.2 MIXED HYPERLIPIDEMIA: Primary | ICD-10-CM

## 2025-08-27 DIAGNOSIS — R73.9 HYPERGLYCEMIA: ICD-10-CM

## 2025-08-27 DIAGNOSIS — E55.9 VITAMIN D DEFICIENCY: ICD-10-CM

## 2025-08-27 DIAGNOSIS — Z12.5 ENCOUNTER FOR PROSTATE CANCER SCREENING: ICD-10-CM

## 2025-09-03 LAB
25(OH)D3+25(OH)D2 SERPL-MCNC: 38 NG/ML (ref 30–100)
ALBUMIN SERPL-MCNC: 4.5 G/DL (ref 3.6–5.1)
ALP SERPL-CCNC: 86 U/L (ref 35–144)
ALT SERPL-CCNC: 20 U/L (ref 9–46)
ANION GAP SERPL CALCULATED.4IONS-SCNC: 9 MMOL/L (CALC) (ref 7–17)
AST SERPL-CCNC: 16 U/L (ref 10–35)
BASOPHILS # BLD AUTO: 49 CELLS/UL (ref 0–200)
BASOPHILS NFR BLD AUTO: 0.6 %
BILIRUB SERPL-MCNC: 0.7 MG/DL (ref 0.2–1.2)
BUN SERPL-MCNC: 25 MG/DL (ref 7–25)
CALCIUM SERPL-MCNC: 9.3 MG/DL (ref 8.6–10.3)
CHLORIDE SERPL-SCNC: 102 MMOL/L (ref 98–110)
CHOLEST SERPL-MCNC: 169 MG/DL
CHOLEST/HDLC SERPL: 4.7 (CALC)
CO2 SERPL-SCNC: 32 MMOL/L (ref 20–32)
CREAT SERPL-MCNC: 1.68 MG/DL (ref 0.7–1.28)
EGFRCR SERPLBLD CKD-EPI 2021: 43 ML/MIN/1.73M2
EOSINOPHIL # BLD AUTO: 328 CELLS/UL (ref 15–500)
EOSINOPHIL NFR BLD AUTO: 4 %
ERYTHROCYTE [DISTWIDTH] IN BLOOD BY AUTOMATED COUNT: 14.1 % (ref 11–15)
EST. AVERAGE GLUCOSE BLD GHB EST-MCNC: 134 MG/DL
EST. AVERAGE GLUCOSE BLD GHB EST-SCNC: 7.4 MMOL/L
GLUCOSE SERPL-MCNC: 106 MG/DL (ref 65–99)
HBA1C MFR BLD: 6.3 %
HCT VFR BLD AUTO: 41.4 % (ref 38.5–50)
HDLC SERPL-MCNC: 36 MG/DL
HGB BLD-MCNC: 13.7 G/DL (ref 13.2–17.1)
LDLC SERPL CALC-MCNC: 96 MG/DL (CALC)
LYMPHOCYTES # BLD AUTO: 1615 CELLS/UL (ref 850–3900)
LYMPHOCYTES NFR BLD AUTO: 19.7 %
MCH RBC QN AUTO: 30.9 PG (ref 27–33)
MCHC RBC AUTO-ENTMCNC: 33.1 G/DL (ref 32–36)
MCV RBC AUTO: 93.2 FL (ref 80–100)
MONOCYTES # BLD AUTO: 672 CELLS/UL (ref 200–950)
MONOCYTES NFR BLD AUTO: 8.2 %
NEUTROPHILS # BLD AUTO: 5535 CELLS/UL (ref 1500–7800)
NEUTROPHILS NFR BLD AUTO: 67.5 %
NONHDLC SERPL-MCNC: 133 MG/DL (CALC)
PLATELET # BLD AUTO: 215 THOUSAND/UL (ref 140–400)
PMV BLD REES-ECKER: 10.3 FL (ref 7.5–12.5)
POTASSIUM SERPL-SCNC: 4.9 MMOL/L (ref 3.5–5.3)
PROT SERPL-MCNC: 7.4 G/DL (ref 6.1–8.1)
PSA SERPL-MCNC: 7.12 NG/ML
RBC # BLD AUTO: 4.44 MILLION/UL (ref 4.2–5.8)
SODIUM SERPL-SCNC: 143 MMOL/L (ref 135–146)
TRIGL SERPL-MCNC: 256 MG/DL
TSH SERPL-ACNC: 1.69 MIU/L (ref 0.4–4.5)
WBC # BLD AUTO: 8.2 THOUSAND/UL (ref 3.8–10.8)

## 2025-09-19 ENCOUNTER — APPOINTMENT (OUTPATIENT)
Dept: PRIMARY CARE | Facility: CLINIC | Age: 75
End: 2025-09-19
Payer: MEDICARE